# Patient Record
Sex: FEMALE | Race: WHITE
[De-identification: names, ages, dates, MRNs, and addresses within clinical notes are randomized per-mention and may not be internally consistent; named-entity substitution may affect disease eponyms.]

---

## 2019-03-29 ENCOUNTER — HOSPITAL ENCOUNTER (EMERGENCY)
Dept: HOSPITAL 56 - MW.ED | Age: 24
Discharge: HOME | End: 2019-03-29
Payer: MEDICAID

## 2019-03-29 DIAGNOSIS — N39.0: Primary | ICD-10-CM

## 2019-03-29 NOTE — EDM.PDOC
ED HPI GENERAL MEDICAL PROBLEM





- General


Chief Complaint: Abdominal Pain


Stated Complaint: PAIN IN VAGINA


Time Seen by Provider: 03/29/19 15:48





- History of Present Illness


INITIAL COMMENTS - FREE TEXT/NARRATIVE: 


HISTORY AND PHYSICAL:





History of present illness:


Patient 23-year-old female presents with a concern of frequency and discomfort 

with urination worse times the last 24 hours he denies fever chills nausea 

vomiting or other complaints is been no vaginal discharge or irregular bleeding 

she denies pregnancy





Review of systems: 


As per history of present illness and below otherwise all systems reviewed and 

negative.





Past medical history: 


As per history of present illness and as reviewed below otherwise 

noncontributory.





Surgical history: 


As per history of present illness and as reviewed below otherwise 

noncontributory.





Social history: 


No reported history of drug or alcohol abuse.





Family history: 


As per history of present illness and as reviewed below otherwise 

noncontributory.





Physical exam:


HEENT: Atraumatic, normocephalic, pupils reactive, negative for conjunctival 

pallor or scleral icterus, mucous membranes moist, throat clear, neck supple, 

nontender, trachea midline.


Lungs: Clear to auscultation, breath sounds equal bilaterally, chest nontender.


Heart: S1S2, regular, negative for clicks, rubs, or JVD.


Abdomen: Soft, nondistended, nontender. Negative for masses or 

hepatosplenomegaly. Negative for costovertebral tenderness.


Pelvis: Stable nontender.


Genitourinary: Deferred.


Rectal: Deferred.


Extremities: Atraumatic, negative for cords or calf pain. Neurovascular 

unremarkable.


Neuro: Awake, alert, oriented. Cranial nerves II through XII unremarkable. 

Cerebellum unremarkable. Motor and sensory unremarkable throughout. Exam 

nonfocal.





Diagnostics:


UA





Therapeutics:


None





Impression: 


# 1 urinary tract infection





Definitive disposition and diagnosis as appropriate pending reevaluation and 

review of above.





  ** vaginal pain


Pain Score (Numeric/FACES): 9





- Related Data


 Allergies











Allergy/AdvReac Type Severity Reaction Status Date / Time


 


No Known Allergies Allergy   Verified 03/29/19 14:34











Home Meds: 


 Home Meds





. [No Known Home Meds]  03/29/19 [History]











Past Medical History





- Past Health History


Medical/Surgical History: Denies Medical/Surgical History





- Infectious Disease History


Infectious Disease History: Reports: None





Social & Family History





- Family History


Family Medical History: Noncontributory





- Tobacco Use


Smoking Status *Q: Never Smoker





- Recreational Drug Use


Recreational Drug Use: No





ED ROS GENERAL





- Review of Systems


Review Of Systems: ROS reveals no pertinent complaints other than HPI.





ED EXAM, GENERAL





- Physical Exam


Exam: See Below (See dictation)





Course





- Vital Signs


Last Recorded V/S: 





 Last Vital Signs











Temp  36.3 C   03/29/19 14:32


 


Pulse  89   03/29/19 14:32


 


Resp  18   03/29/19 14:32


 


BP  120/79   03/29/19 14:32


 


Pulse Ox  97   03/29/19 14:32














- Orders/Labs/Meds


Orders: 





 Active Orders 24 hr











 Category Date Time Status


 


 CULTURE URINE [RM] Stat Lab  03/29/19 14:36 Received











Labs: 





 Laboratory Tests











  03/29/19 03/29/19 Range/Units





  14:36 14:36 


 


Urine Color  YELLOW   


 


Urine Appearance  CLOUDY   


 


Urine pH  7.0   (5.0-8.0)  


 


Ur Specific Gravity  1.020   (1.001-1.035)  


 


Urine Protein  TRACE H   (NEGATIVE)  mg/dL


 


Urine Glucose (UA)  NEGATIVE   (NEGATIVE)  mg/dL


 


Urine Ketones  NEGATIVE   (NEGATIVE)  mg/dL


 


Urine Occult Blood  NEGATIVE   (NEGATIVE)  


 


Urine Nitrite  NEGATIVE   (NEGATIVE)  


 


Urine Bilirubin  SMALL H   (NEGATIVE)  


 


Urine Urobilinogen  1.0   (<2.0)  EU/dL


 


Ur Leukocyte Esterase  MODERATE H   (NEGATIVE)  


 


Urine RBC  0-2   (0-2/HPF)  


 


Urine WBC  TO NUMEROUS TO COUNT H   (0-5/HPF)  


 


Ur Epithelial Cells  FEW   (NONE-FEW)  


 


Urine Bacteria  3+ H   (NEGATIVE)  


 


Urine Mucus  LIGHT   (NONE-MOD)  


 


Urine HCG, Qual   NEGATIVE  (NEGATIVE)  














Departure





- Departure


Time of Disposition: 15:47


Disposition: Home, Self-Care 01


Condition: Good


Clinical Impression: 


 UTI (urinary tract infection)








- Discharge Information


Instructions:  Urinary Tract Infection, Adult, Easy-to-Read


Referrals: 


PCP,Unknown [Primary Care Provider] - 


Forms:  ED Department Discharge


Additional Instructions: 


The following information is given to patients seen in the emergency department 

who are being discharged to home. This information is to outline your options 

for follow-up care. We provide all patients seen in our emergency department 

with a follow-up referral.





The need for follow-up, as well as the timing and circumstances, are variable 

depending upon the specifics of your emergency department visit.





If you don't have a primary care physician on staff, we will provide you with a 

referral. We always advise you to contact your personal physician following an 

emergency department visit to inform them of the circumstance of the visit and 

for follow-up with them and/or the need for any referrals to a consulting 

specialist.





The emergency department will also refer you to a specialist when appropriate. 

This referral assures that you have the opportunity for followup care with a 

specialist. All of these measure are taken in an effort to provide you with 

optimal care, which includes your followup.





Under all circumstances we always encourage you to contact your private 

physician who remains a resource for coordinating  your care. When calling for 

followup care, please make the office aware that this follow-up is from your 

recent emergency room visit. If for any reason you are refused follow-up, 

please contact the [facility] emergency department at [phone] 

and asked to speak to the emergency department charge nurse.




















Bactrim as prescribed Pyridium as prescribed push fluids follow primary medical 

doctor as needed as discussed and return as needed as discussed





- My Orders


Last 24 Hours: 





My Active Orders





03/29/19 14:36


CULTURE URINE [RM] Stat 














- Assessment/Plan


Last 24 Hours: 





My Active Orders





03/29/19 14:36


CULTURE URINE [RM] Stat